# Patient Record
Sex: FEMALE | Race: ASIAN
[De-identification: names, ages, dates, MRNs, and addresses within clinical notes are randomized per-mention and may not be internally consistent; named-entity substitution may affect disease eponyms.]

---

## 2019-08-04 ENCOUNTER — HOSPITAL ENCOUNTER (EMERGENCY)
Dept: HOSPITAL 10 - FTE | Age: 39
Discharge: HOME | End: 2019-08-04
Payer: COMMERCIAL

## 2019-08-04 ENCOUNTER — HOSPITAL ENCOUNTER (EMERGENCY)
Dept: HOSPITAL 91 - FTE | Age: 39
Discharge: HOME | End: 2019-08-04
Payer: COMMERCIAL

## 2019-08-04 VITALS
WEIGHT: 128.53 LBS | WEIGHT: 128.53 LBS | BODY MASS INDEX: 21.94 KG/M2 | HEIGHT: 64 IN | HEIGHT: 64 IN | BODY MASS INDEX: 21.94 KG/M2

## 2019-08-04 VITALS — DIASTOLIC BLOOD PRESSURE: 56 MMHG | SYSTOLIC BLOOD PRESSURE: 91 MMHG | HEART RATE: 87 BPM | RESPIRATION RATE: 20 BRPM

## 2019-08-04 DIAGNOSIS — R05: Primary | ICD-10-CM

## 2019-08-04 LAB
ADD MAN DIFF?: NO
ANION GAP: 9 (ref 5–13)
BASOPHIL #: 0 10^3/UL (ref 0–0.1)
BASOPHILS %: 0.3 % (ref 0–2)
BLOOD UREA NITROGEN: 5 MG/DL (ref 7–20)
CALCIUM: 8.4 MG/DL (ref 8.4–10.2)
CARBON DIOXIDE: 21 MMOL/L (ref 21–31)
CHLORIDE: 109 MMOL/L (ref 97–110)
CREATININE: 0.47 MG/DL (ref 0.44–1)
EOSINOPHILS #: 0 10^3/UL (ref 0–0.5)
EOSINOPHILS %: 0.5 % (ref 0–7)
GLUCOSE: 82 MG/DL (ref 70–220)
HEMATOCRIT: 33.2 % (ref 37–47)
HEMOGLOBIN: 11.2 G/DL (ref 12–16)
IMMATURE GRANS #M: 0.04 10^3/UL (ref 0–0.03)
IMMATURE GRANS % (M): 0.5 % (ref 0–0.43)
LYMPHOCYTES #: 1.7 10^3/UL (ref 0.8–2.9)
LYMPHOCYTES %: 23.2 % (ref 15–51)
MEAN CORPUSCULAR HEMOGLOBIN: 33.4 PG (ref 29–33)
MEAN CORPUSCULAR HGB CONC: 33.7 G/DL (ref 32–37)
MEAN CORPUSCULAR VOLUME: 99.1 FL (ref 82–101)
MEAN PLATELET VOLUME: 10.3 FL (ref 7.4–10.4)
MONOCYTE #: 0.9 10^3/UL (ref 0.3–0.9)
MONOCYTES %: 11.9 % (ref 0–11)
NEUTROPHIL #: 4.7 10^3/UL (ref 1.6–7.5)
NEUTROPHILS %: 63.6 % (ref 39–77)
NUCLEATED RED BLOOD CELLS #: 0 10^3/UL (ref 0–0)
NUCLEATED RED BLOOD CELLS%: 0 /100WBC (ref 0–0)
PLATELET COUNT: 132 10^3/UL (ref 140–415)
POTASSIUM: 3.4 MMOL/L (ref 3.5–5.1)
RED BLOOD COUNT: 3.35 10^6/UL (ref 4.2–5.4)
RED CELL DISTRIBUTION WIDTH: 13.3 % (ref 11.5–14.5)
SODIUM: 139 MMOL/L (ref 135–144)
WHITE BLOOD COUNT: 7.4 10^3/UL (ref 4.8–10.8)

## 2019-08-04 PROCEDURE — 94664 DEMO&/EVAL PT USE INHALER: CPT

## 2019-08-04 PROCEDURE — 99283 EMERGENCY DEPT VISIT LOW MDM: CPT

## 2019-08-04 PROCEDURE — 80048 BASIC METABOLIC PNL TOTAL CA: CPT

## 2019-08-04 PROCEDURE — 85025 COMPLETE CBC W/AUTO DIFF WBC: CPT

## 2019-08-04 RX ADMIN — ALBUTEROL SULFATE 1 MG: 2.5 SOLUTION RESPIRATORY (INHALATION) at 11:12

## 2019-08-06 ENCOUNTER — HOSPITAL ENCOUNTER (INPATIENT)
Dept: HOSPITAL 91 - L-D | Age: 39
LOS: 2 days | Discharge: HOME | DRG: 832 | End: 2019-08-08
Payer: COMMERCIAL

## 2019-08-06 ENCOUNTER — HOSPITAL ENCOUNTER (INPATIENT)
Dept: HOSPITAL 10 - L-D | Age: 39
LOS: 2 days | Discharge: HOME | DRG: 832 | End: 2019-08-08
Attending: SPECIALIST | Admitting: SPECIALIST
Payer: COMMERCIAL

## 2019-08-06 VITALS — HEART RATE: 82 BPM | DIASTOLIC BLOOD PRESSURE: 64 MMHG | SYSTOLIC BLOOD PRESSURE: 104 MMHG | RESPIRATION RATE: 19 BRPM

## 2019-08-06 DIAGNOSIS — J06.9: ICD-10-CM

## 2019-08-06 DIAGNOSIS — O23.03: ICD-10-CM

## 2019-08-06 DIAGNOSIS — O99.513: Primary | ICD-10-CM

## 2019-08-06 DIAGNOSIS — Z3A.34: ICD-10-CM

## 2019-08-06 DIAGNOSIS — J02.9: ICD-10-CM

## 2019-08-06 DIAGNOSIS — J30.9: ICD-10-CM

## 2019-08-06 DIAGNOSIS — O09.523: ICD-10-CM

## 2019-08-06 DIAGNOSIS — O98.413: ICD-10-CM

## 2019-08-06 DIAGNOSIS — R09.82: ICD-10-CM

## 2019-08-06 DIAGNOSIS — B19.10: ICD-10-CM

## 2019-08-06 LAB
ADD MAN DIFF?: NO
ALANINE AMINOTRANSFERASE: 22 IU/L (ref 13–69)
ALBUMIN/GLOBULIN RATIO: 1
ALBUMIN: 3.4 G/DL (ref 3.3–4.9)
ALKALINE PHOSPHATASE: 190 IU/L (ref 42–121)
ANION GAP: 7 (ref 5–13)
ASPARTATE AMINO TRANSFERASE: 35 IU/L (ref 15–46)
BASOPHIL #: 0 10^3/UL (ref 0–0.1)
BASOPHILS %: 0.3 % (ref 0–2)
BILIRUBIN,DIRECT: 0 MG/DL (ref 0–0.2)
BILIRUBIN,TOTAL: 0.8 MG/DL (ref 0.2–1.3)
BLOOD UREA NITROGEN: 6 MG/DL (ref 7–20)
C-REACTIVE PROTEIN: 7.1 MG/DL (ref 0–0.9)
CALCIUM: 8.7 MG/DL (ref 8.4–10.2)
CARBON DIOXIDE: 24 MMOL/L (ref 21–31)
CHLORIDE: 106 MMOL/L (ref 97–110)
CREATININE: 0.52 MG/DL (ref 0.44–1)
EOSINOPHILS #: 0 10^3/UL (ref 0–0.5)
EOSINOPHILS %: 0.5 % (ref 0–7)
GLOBULIN: 3.4 G/DL (ref 1.3–3.2)
GLUCOSE: 102 MG/DL (ref 70–220)
HAAIG REFLEX: (no result)
HEMATOCRIT: 35 % (ref 37–47)
HEMOGLOBIN: 11.4 G/DL (ref 12–16)
HEPATITIS B CORE ANTIBODY: REACTIVE
HEPATITIS B SURFACE ANTIBODY: NEGATIVE
HEPATITIS B SURFACE ANTIGEN: POSITIVE
HEPATITIS C VIRAL ANTIBODY: NEGATIVE
IMMATURE GRANS #M: 0.02 10^3/UL (ref 0–0.03)
IMMATURE GRANS % (M): 0.3 % (ref 0–0.43)
LACTIC ACID: 1.1 MMOL/L (ref 0.5–2)
LYMPHOCYTES #: 0.9 10^3/UL (ref 0.8–2.9)
LYMPHOCYTES %: 13.8 % (ref 15–51)
MEAN CORPUSCULAR HEMOGLOBIN: 32.1 PG (ref 29–33)
MEAN CORPUSCULAR HGB CONC: 32.6 G/DL (ref 32–37)
MEAN CORPUSCULAR VOLUME: 98.6 FL (ref 82–101)
MEAN PLATELET VOLUME: 10.6 FL (ref 7.4–10.4)
MONOCYTE #: 0.6 10^3/UL (ref 0.3–0.9)
MONOCYTES %: 9.3 % (ref 0–11)
NEUTROPHIL #: 5 10^3/UL (ref 1.6–7.5)
NEUTROPHILS %: 75.8 % (ref 39–77)
NUCLEATED RED BLOOD CELLS #: 0 10^3/UL (ref 0–0)
NUCLEATED RED BLOOD CELLS%: 0 /100WBC (ref 0–0)
PLATELET COUNT: 173 10^3/UL (ref 140–415)
POTASSIUM: 3.7 MMOL/L (ref 3.5–5.1)
RED BLOOD COUNT: 3.55 10^6/UL (ref 4.2–5.4)
RED CELL DISTRIBUTION WIDTH: 13.4 % (ref 11.5–14.5)
SODIUM: 137 MMOL/L (ref 135–144)
TOTAL PROTEIN: 6.8 G/DL (ref 6.1–8.1)
WHITE BLOOD COUNT: 6.5 10^3/UL (ref 4.8–10.8)

## 2019-08-06 PROCEDURE — 86709 HEPATITIS A IGM ANTIBODY: CPT

## 2019-08-06 PROCEDURE — 83605 ASSAY OF LACTIC ACID: CPT

## 2019-08-06 PROCEDURE — 87340 HEPATITIS B SURFACE AG IA: CPT

## 2019-08-06 PROCEDURE — 84145 PROCALCITONIN (PCT): CPT

## 2019-08-06 PROCEDURE — 87086 URINE CULTURE/COLONY COUNT: CPT

## 2019-08-06 PROCEDURE — 86704 HEP B CORE ANTIBODY TOTAL: CPT

## 2019-08-06 PROCEDURE — 86692 HEPATITIS DELTA AGENT ANTBDY: CPT

## 2019-08-06 PROCEDURE — 94664 DEMO&/EVAL PT USE INHALER: CPT

## 2019-08-06 PROCEDURE — 76818 FETAL BIOPHYS PROFILE W/NST: CPT

## 2019-08-06 PROCEDURE — 87400 INFLUENZA A/B EACH AG IA: CPT

## 2019-08-06 PROCEDURE — 85025 COMPLETE CBC W/AUTO DIFF WBC: CPT

## 2019-08-06 PROCEDURE — 86140 C-REACTIVE PROTEIN: CPT

## 2019-08-06 PROCEDURE — 80053 COMPREHEN METABOLIC PANEL: CPT

## 2019-08-06 PROCEDURE — 94640 AIRWAY INHALATION TREATMENT: CPT

## 2019-08-06 PROCEDURE — 81003 URINALYSIS AUTO W/O SCOPE: CPT

## 2019-08-06 PROCEDURE — 87070 CULTURE OTHR SPECIMN AEROBIC: CPT

## 2019-08-06 PROCEDURE — 86803 HEPATITIS C AB TEST: CPT

## 2019-08-06 PROCEDURE — 71046 X-RAY EXAM CHEST 2 VIEWS: CPT

## 2019-08-06 PROCEDURE — 86706 HEP B SURFACE ANTIBODY: CPT

## 2019-08-06 RX ADMIN — HYDROCODONE BITARTRATE AND ACETAMINOPHEN 1 TAB: 5; 325 TABLET ORAL at 23:30

## 2019-08-06 RX ADMIN — CEFTRIAXONE 1 MLS/HR: 1 INJECTION, SOLUTION INTRAVENOUS at 21:44

## 2019-08-06 RX ADMIN — PYRIDOXINE HYDROCHLORIDE SCH MLS/HR: 100 INJECTION, SOLUTION INTRAMUSCULAR; INTRAVENOUS at 21:49

## 2019-08-06 RX ADMIN — MOMETASONE FUROATE 1 PUFF: 220 INHALANT RESPIRATORY (INHALATION) at 21:50

## 2019-08-06 RX ADMIN — PYRIDOXINE HYDROCHLORIDE 1 MLS/HR: 100 INJECTION, SOLUTION INTRAMUSCULAR; INTRAVENOUS at 18:56

## 2019-08-06 RX ADMIN — PYRIDOXINE HYDROCHLORIDE SCH MLS/HR: 100 INJECTION, SOLUTION INTRAMUSCULAR; INTRAVENOUS at 18:56

## 2019-08-06 RX ADMIN — CEFTRIAXONE SCH MLS/HR: 1 INJECTION, SOLUTION INTRAVENOUS at 21:44

## 2019-08-06 RX ADMIN — ALBUTEROL SULFATE 1 MG: 2.5 SOLUTION RESPIRATORY (INHALATION) at 22:46

## 2019-08-06 RX ADMIN — OSELTAMIVIR PHOSPHATE 1 MG: 75 CAPSULE ORAL at 22:30

## 2019-08-06 RX ADMIN — BENZONATATE PRN MG: 100 CAPSULE ORAL at 21:48

## 2019-08-06 RX ADMIN — PYRIDOXINE HYDROCHLORIDE 1 MLS/HR: 100 INJECTION, SOLUTION INTRAMUSCULAR; INTRAVENOUS at 21:49

## 2019-08-06 RX ADMIN — OSELTAMIVIR PHOSPHATE SCH MG: 75 CAPSULE ORAL at 22:30

## 2019-08-06 RX ADMIN — PROMETHAZINE HYDROCHLORIDE AND CODEINE PHOSPHATE 1 ML: 6.25; 1 SOLUTION ORAL at 19:17

## 2019-08-06 RX ADMIN — AZITHROMYCIN MONOHYDRATE SCH MLS/HR: 500 INJECTION, POWDER, LYOPHILIZED, FOR SOLUTION INTRAVENOUS at 22:36

## 2019-08-06 RX ADMIN — MOMETASONE FUROATE SCH PUFF: 220 INHALANT RESPIRATORY (INHALATION) at 21:50

## 2019-08-06 RX ADMIN — BENZONATATE 1 MG: 100 CAPSULE ORAL at 21:48

## 2019-08-06 RX ADMIN — AZITHROMYCIN MONOHYDRATE 1 MLS/HR: 500 INJECTION, POWDER, LYOPHILIZED, FOR SOLUTION INTRAVENOUS at 22:36

## 2019-08-06 NOTE — HP
Date/Time of Note


Date/Time of Note


DATE: 19 


TIME: 18:38





OB - History


Hx of Present Pregnancy


Free Text/Dictation


39-year-old  2 para 1 with pregnancy at 34 weeks and 2 days with due date


2019.  Patient developed upper respiratory tract infection about a


week ago with sore throat body aches fever and productive cough.  After a few 


days she developed right flank pain which may have been due to pulling her 


muscles.  She is in pain every time she coughs and right flank hurts.  She 


denies nausea vomiting.  She denies contractions vaginal bleeding or loss of 


fluid per vagina.  She reports good fetal movements.  She denies earaches 


headaches or shortness of breath.  She was seen in the emergency room at [facility] but since then her condition has deteriorated impatiens VU


and her .  Her  is also a physician from another country.  Patient


reports that she is very concerned for her well-being and she believes she is 


getting worse.


Prenatal Care:  Good Care


Ultrasounds:  Normal mid trimester US


Obstetrical Complications:  None


Medical Complications:  None





Past Family/Social History


*


Past Medical, Surgical, Family and Obstetric Histories reviewed from prenatal 


chart.





OB  Admission Exam


Physical Exam


HEENT:  WNL


Heart:  Rhythm Normal


Lungs:  Clear, Equal


Abdomen:  WNL


Extremities:  Normal


Reflexes:  Other (BACK: 2+ R CVA tenderness)


Last 72 hours Lab Results


                                    CBC & BMP


19 17:13











                                 Liver Function


               Test
                                19
17:13


               Alanine Aminotransferase
(ALT/SGPT)         22  



               Albumin                                     3.4


               Alkaline Phosphatase                       190  H


               Aspartate Amino Transf
(AST/SGOT)           35  



               Direct Bilirubin                           0.00


               Total Protein                               6.8








OB  Assessment/Plan


Other Assessment:


Pregnancy at 34 weeks and 2 days


Upper respiratory tract infection most probably viral


Right flank pain may be due to pyelonephritis versus a pulled ligament or muscle


due to chronic cough over the past week.


Other plan:


Admit to hospital.


IV Rocephin and Zithromax.


Urine culture and basic blood work.


Chest x-ray was negative for pneumonia











MARIAM LOPES MD             Aug 6, 2019 18:43

## 2019-08-06 NOTE — CONS
Assessment/Plan


Assessment/Plan


Hospital Course (Demo Recall)





#1 upper respiratory infection: Chest x-ray does not show any signs of pneumon


ia.  Patient is currently afebrile.  Suspicion for likely underlying viral 


versus bronchitis versus other.  She has been initiated on ceftriaxone and 


azithromycin already we will continue this.  I will check CRP and prolactin 


level.  We will also provide ipratropium nebulization as needed along with 


albuterol which is already on board.  She does have promethazine with codeine 


for cough 5 mL's however this has not helped I will increase the dose to 10 mL's


PRN.  We will also continue Tessalon Perles.





#2 right-sided flank pain: Likely muscle strain from repeated cough, nonetheless


we will also check a urinalysis to assess for underlying urine infection in the 


setting of possible pyelonephritis.  She is already on ceftriaxone.  Will give 


one-time dose of Norco 5-325 mg p.o. as Tylenol has not helped for her pain.  


Kidney function is within normal values.





#3 hepatitis B infection: Patient has been taking tenofovir 300 mg p.o. daily, 


will continue this.,  Will check hepatic serology.





#4 DVT and GI prophylaxis: CDs, no GI prophylaxis indicated





Thank you for this consultation we will continue to follow with you.





Consultation Date/Type/Reason


Admit Date/Time


Aug 6, 2019 at 15:50


Date of Consultation:  Aug 6, 2019


Type of Consult


medical management


Reason for Consultation


fever, cough, flank pain


Requesting Provider:  MARIAM WARD MD


Date/Time of Note


DATE: 8/6/19 


TIME: 20:24





Hx of Present Illness


Chief complaint: Cough x1 week, fever, sore throat





This is a 39-year-old female who is currently 34 weeks and 2 days pregnant who 


returns to [facility] with symptoms of cough that is been going on for 


approximately 1 week.  Patient originally was seen in the emergency department 


on 8/4 with similar symptoms.  Patient returns today with continued symptoms of 


a cough that is productive of yellowish-greenish sputum.  Patient also reports 


that she has been coughing a lot and thinks it possibly due to that she is 


having right-sided flank pain.  She denies any urinary symptoms.  Denies any 


dysuria.  She denies any abdominal contractions or vaginal bleeding.  She was 


seen by the OB doctor Dr. Ward and was admitted for further management.  


She was started on ceftriaxone and azithromycin.  She also did report good fetal


movements.  Patient does report that she has a history of hepatitis B infection 


and she is currently taking tenofovir 300 mg p.o. daily.  She denies any rashes 


or any vaginal discharge.  Denies any jaundice or any tea colored urine.








Allergies: NKDA





Medications: 


Albuterol as needed 


Tessalon Perles as needed


Tenofovir 300 mg p.o. daily


Const: As per HPI


Eyes : No pain discharge or redness or change in visual acuity


ENT: No pain, sore throat, congestion, congestion,  dysphagia or discharge


Respiratory: As per HPI


Cardiovascular: No chest pain, palpitation, PND, or edema


GI : no change in appetite, abdominal pain, nausea, vomiting, diarrhea, 


constipation, or change in the color his stool 


Genitourinary: No dysuria, hematuria, flank pain ,  discharge or CVA tenderness


Musculoskeletal: As per HPI


Skin: No rash, bruising or hives 


Neuro: No headache, dizziness, syncope, seizure, focal weakness


Endocrine: No polyuria, polydipsia, temperature intolerance


Psych: No hallucination, depression, anxiety or suicidal ideation





Past Medical History


hep b


Home Meds


Active Scripts


Albuterol Sulfate* (Albuterol Sulfate* Neb) 0.083%-3 Ml Neb, 2.5 MG NEB Q4 PRN 


for SHORTNESS OF BREATH, #30 EA


   Prov:TOMÁS MEYER PA-C         8/4/19


Benzonatate* (Tessalon Perle*) 100 Mg Capsule, 100 MG PO Q8H PRN for COUGH for 7


Days, CAP


   Prov:TOMÁS MEYER PA-C         8/4/19


Reported Medications


Tenofovir Disoproxil Fumarate (Tenofovir Disoproxil Fumarate) 300 Mg Tablet, 300


MG PO DAILY, TAB


   8/7/19


Medications





Current Medications


Acetaminophen (Tylenol Tab) 1,000 mg Q6H  PRN PO MILD PAIN(1-3)OR ELEVATED TEMP;


 Start 8/6/19 at 18:30


Albuterol (Proventil 0.083% (Neb)) 2.5 mg Q4H RESP THERAPY  PRN NEB SHORTNESS OF


BREATH;  Start 8/6/19 at 18:30


Benzonatate (Tessalon) 100 mg Q8H  PRN PO COUGH;  Start 8/6/19 at 18:30


Oseltamivir Phosphate (Tamiflu) 75 mg BID PO ;  Start 8/6/19 at 21:00


Promethazine HCl/ Codeine (Phenergan/ Codeine) 5 ml Q4H  PRN PO COUGH Last 


administered on 8/6/19at 19:17; Admin Dose 5 ML;  Start 8/6/19 at 18:30


Ceftriaxone Sodium 50 ml @  100 mls/hr Q24H IVPB ;  Start 8/6/19 at 18:30


Azithromycin 250 ml @  250 mls/hr Q24H IVPB ;  Start 8/6/19 at 18:30


Ondansetron HCl (Zofran Odt) 4 mg Q6H  PRN ODT NAUSEA/VOMITING;  Start 8/6/19 at


18:30


Diphenhydramine HCl (Benadryl) 25 mg QHS  PRN PO .INSOMNIA;  Start 8/6/19 at 


18:30


Albuterol (Ventolin Hfa) 2 puff Q4H RESP THERAPY  PRN INH SHORTNESS OF BREATH;  


Start 8/6/19 at 18:30


Mometasone Furoate (Asmanex) 1 puff BID INH ;  Start 8/6/19 at 21:00


Lactated Ringer's 1,000 ml @  125 mls/hr Q8H IV  Last administered on 8/6/19at 


18:56; Admin Dose 125 MLS/HR;  Start 8/6/19 at 19:00


Allergies:  


Coded Allergies:  


     No Known Allergy (Unverified , 8/4/19)





Past Surgical History


Past Surgical Hx:  no surgical history





Family History


Significant Family History:  no pertinent family hx





Social History


Alcohol Use:  none


Smoking Status:  Never smoker


Drug Use:  none





Exam/Review of Systems


Exam


Exam





General: Pacing the room, repeatedly coughing with production of yellowish-green


nasal discharge/sputum


HEENT: Atraumatic, normocephalic. The pupils are equal, round and reactive. 


Extraocular motor are intact


Neck: Supple with full range of motion. No rigidity or meningismus


Chest: Nontender


Lungs: Clear to auscultation bilaterally no crackles rales or wheezing, 


productive cough


Heart: Normal S1-S2, Regular rhythm and rate. No murmur, S3, or S4


Abdomen: Soft , nontender,  nondistended , bowel sounds are present. No guarding


no rebound tenderness , No masses or organomegaly. 


Genitourinary: Mild right-sided flank tenderness palpation


Extremities: Normal to inspection, no edema no cyanosis


Neurologic: Normal mental status, speech normal, cranial nerves II through XII 


are intact, motor and sensory are intact, no focal weakness


Additional Comments


PROCEDURE:   XR Chest PA and Lateral


 


CLINICAL INDICATION:   Pneumonia 


 


TECHNIQUE:   PA and Lateral views of the chest were obtained. 


 


COMPARISON:   None. 


 


FINDINGS:


 


Cardiovascular: The cardiovascular silhouette appears unremarkable.


 


Lung Fields: The lung fields appear clear with no nodule, alveolar infiltrate, 


or interstitial prominence evident.


 


Pleural Spaces: No pneumothorax is identified and no effusion is evident.


 


Osseous Structures: The osseous structures appear intact.


 


Soft Tissues: The soft tissues appear unremarkable.


 


IMPRESSION: Unremarkable chest.


_____________________________________________ 


Physician Garfield           Date    Time 


Electronically viewed and signed by Physician Garfield on 08/06/2019 17:50 


 


D:  08/06/2019 17:50  T:  08/06/2019 17:50


RH/





CC: MARIAM WARD MD





[id number]


PROCEDURE:   US OB biophysical profile. 


 


CLINICAL INDICATION:    decreased fetal movements, pneumonia 


 


TECHNIQUE:   Multiple sonographic images of the pelvis were obtained.  The 


images were reviewed on a PACS workstation. 


 


COMPARISON:   No prior studies are available for comparison. 


 


FINDINGS:


 


There is a single live intrauterine gestation.  Cardiac activity is present with


166 beats per minute. 


There is a vertex presentation. 


The placenta is posterior left lateral.   There is no evidence of placental 


abruption.


REY = 9.9 cm.


 


Biophysical profile:


Fetal movement 2/2


Fetal tone 2/2.


Fetal breathing 2/2 


REY 2/2


 


Total 8/8 


 


RPTAT: AA . 


 


IMPRESSION:


 


Normal biophysical profile.


_____________________________________________ 


.Fran Herrera MD, MD           Date    Time 


Electronically viewed and signed by .Fran Herrera MD, MD on 08/06/2019 


18:07 


 


D:  08/06/2019 18:07  T:  08/06/2019 18:07


.S/





CC: MARIAM WARD MD





[id number]





Results


Result Diagram:  


8/6/19 1713                                                                     


          8/6/19 1713





Results 24hrs





Laboratory Tests


               Test
                                8/6/19
17:13


               White Blood Count                           6.5


               Red Blood Count                           3.55  L


               Hemoglobin                                11.4  L


               Hematocrit                                35.0  L


               Mean Corpuscular Volume                    98.6


               Mean Corpuscular Hemoglobin                32.1


               Mean Corpuscular Hemoglobin
Concent       32.6  



               Red Cell Distribution Width                13.4


               Platelet Count                             173  #


               Mean Platelet Volume                      10.6  H


               Immature Granulocytes %                   0.300


               Neutrophils %                              75.8


               Lymphocytes %                             13.8  L


               Monocytes %                                 9.3


               Eosinophils %                               0.5


               Basophils %                                 0.3


               Nucleated Red Blood Cells %                 0.0


               Immature Granulocytes #                   0.020


               Neutrophils #                               5.0


               Lymphocytes #                               0.9


               Monocytes #                                 0.6


               Eosinophils #                               0.0


               Basophils #                                 0.0


               Nucleated Red Blood Cells #                 0.0


               Sodium Level                                137


               Potassium Level                             3.7


               Chloride Level                              106


               Carbon Dioxide Level                         24


               Anion Gap                                     7


               Blood Urea Nitrogen                          6  L


               Creatinine                                 0.52


               Est Glomerular Filtrat Rate
mL/min   > 60  



               Glucose Level                               102


               Lactic Acid Level                           1.1


               Calcium Level                               8.7


               Total Bilirubin                             0.8


               Direct Bilirubin                           0.00


               Indirect Bilirubin                          0.8


               Aspartate Amino Transf
(AST/SGOT)           35  



               Alanine Aminotransferase
(ALT/SGPT)         22  



               Alkaline Phosphatase                       190  H


               Total Protein                               6.8


               Albumin                                     3.4


               Globulin                                  3.40  H


               Albumin/Globulin Ratio                     1.00








Medications


Medication





Current Medications


Acetaminophen (Tylenol Tab) 1,000 mg Q6H  PRN PO MILD PAIN(1-3)OR ELEVATED TEMP;


 Start 8/6/19 at 18:30


Albuterol (Proventil 0.083% (Neb)) 2.5 mg Q4H RESP THERAPY  PRN NEB SHORTNESS OF


BREATH;  Start 8/6/19 at 18:30


Benzonatate (Tessalon) 100 mg Q8H  PRN PO COUGH;  Start 8/6/19 at 18:30


Oseltamivir Phosphate (Tamiflu) 75 mg BID PO ;  Start 8/6/19 at 21:00


Promethazine HCl/ Codeine (Phenergan/ Codeine) 5 ml Q4H  PRN PO COUGH Last 


administered on 8/6/19at 19:17; Admin Dose 5 ML;  Start 8/6/19 at 18:30


Ceftriaxone Sodium 50 ml @  100 mls/hr Q24H IVPB ;  Start 8/6/19 at 18:30


Azithromycin 250 ml @  250 mls/hr Q24H IVPB ;  Start 8/6/19 at 18:30


Ondansetron HCl (Zofran Odt) 4 mg Q6H  PRN ODT NAUSEA/VOMITING;  Start 8/6/19 at


18:30


Diphenhydramine HCl (Benadryl) 25 mg QHS  PRN PO .INSOMNIA;  Start 8/6/19 at 


18:30


Albuterol (Ventolin Hfa) 2 puff Q4H RESP THERAPY  PRN INH SHORTNESS OF BREATH;  


Start 8/6/19 at 18:30


Mometasone Furoate (Asmanex) 1 puff BID INH ;  Start 8/6/19 at 21:00


Lactated Ringer's 1,000 ml @  125 mls/hr Q8H IV  Last administered on 8/6/19at 


18:56; Admin Dose 125 MLS/HR;  Start 8/6/19 at 19:00











MACIEJ BURGER                  Aug 6, 2019 20:25

## 2019-08-07 VITALS — HEART RATE: 94 BPM | SYSTOLIC BLOOD PRESSURE: 100 MMHG | DIASTOLIC BLOOD PRESSURE: 56 MMHG | RESPIRATION RATE: 16 BRPM

## 2019-08-07 VITALS — DIASTOLIC BLOOD PRESSURE: 55 MMHG | HEART RATE: 81 BPM | RESPIRATION RATE: 17 BRPM | SYSTOLIC BLOOD PRESSURE: 100 MMHG

## 2019-08-07 VITALS — DIASTOLIC BLOOD PRESSURE: 59 MMHG | RESPIRATION RATE: 18 BRPM | SYSTOLIC BLOOD PRESSURE: 97 MMHG | HEART RATE: 89 BPM

## 2019-08-07 VITALS — RESPIRATION RATE: 18 BRPM | DIASTOLIC BLOOD PRESSURE: 56 MMHG | HEART RATE: 95 BPM | SYSTOLIC BLOOD PRESSURE: 95 MMHG

## 2019-08-07 LAB
ADD MAN DIFF?: NO
ADD UMIC: NO
ALANINE AMINOTRANSFERASE: 22 IU/L (ref 13–69)
ALBUMIN/GLOBULIN RATIO: 0.88
ALBUMIN: 3.1 G/DL (ref 3.3–4.9)
ALKALINE PHOSPHATASE: 191 IU/L (ref 42–121)
ANION GAP: 10 (ref 5–13)
ASPARTATE AMINO TRANSFERASE: 24 IU/L (ref 15–46)
BASOPHIL #: 0 10^3/UL (ref 0–0.1)
BASOPHILS %: 0.3 % (ref 0–2)
BILIRUBIN,DIRECT: 0 MG/DL (ref 0–0.2)
BILIRUBIN,TOTAL: 0.7 MG/DL (ref 0.2–1.3)
BLOOD UREA NITROGEN: 5 MG/DL (ref 7–20)
CALCIUM: 8.7 MG/DL (ref 8.4–10.2)
CARBON DIOXIDE: 24 MMOL/L (ref 21–31)
CHLORIDE: 107 MMOL/L (ref 97–110)
CREATININE: 0.55 MG/DL (ref 0.44–1)
EOSINOPHILS #: 0 10^3/UL (ref 0–0.5)
EOSINOPHILS %: 0.5 % (ref 0–7)
GLOBULIN: 3.5 G/DL (ref 1.3–3.2)
GLUCOSE: 102 MG/DL (ref 70–220)
HEMATOCRIT: 35.2 % (ref 37–47)
HEMOGLOBIN: 11.4 G/DL (ref 12–16)
IMMATURE GRANS #M: 0.04 10^3/UL (ref 0–0.03)
IMMATURE GRANS % (M): 0.6 % (ref 0–0.43)
LYMPHOCYTES #: 1.2 10^3/UL (ref 0.8–2.9)
LYMPHOCYTES %: 18.8 % (ref 15–51)
MEAN CORPUSCULAR HEMOGLOBIN: 32.3 PG (ref 29–33)
MEAN CORPUSCULAR HGB CONC: 32.4 G/DL (ref 32–37)
MEAN CORPUSCULAR VOLUME: 99.7 FL (ref 82–101)
MEAN PLATELET VOLUME: 10.3 FL (ref 7.4–10.4)
MONOCYTE #: 0.6 10^3/UL (ref 0.3–0.9)
MONOCYTES %: 9.3 % (ref 0–11)
NEUTROPHIL #: 4.6 10^3/UL (ref 1.6–7.5)
NEUTROPHILS %: 70.5 % (ref 39–77)
NUCLEATED RED BLOOD CELLS #: 0 10^3/UL (ref 0–0)
NUCLEATED RED BLOOD CELLS%: 0 /100WBC (ref 0–0)
PLATELET COUNT: 175 10^3/UL (ref 140–415)
POTASSIUM: 3.4 MMOL/L (ref 3.5–5.1)
PROCALCITONIN: 0.06 NG/ML (ref 0–0.1)
RED BLOOD COUNT: 3.53 10^6/UL (ref 4.2–5.4)
RED CELL DISTRIBUTION WIDTH: 13.6 % (ref 11.5–14.5)
SODIUM: 141 MMOL/L (ref 135–144)
TOTAL PROTEIN: 6.6 G/DL (ref 6.1–8.1)
UR ASCORBIC ACID: NEGATIVE MG/DL
UR BILIRUBIN (DIP): NEGATIVE MG/DL
UR BLOOD (DIP): NEGATIVE MG/DL
UR CLARITY: CLEAR
UR COLOR: YELLOW
UR GLUCOSE (DIP): NEGATIVE MG/DL
UR KETONES (DIP): (no result) MG/DL
UR LEUKOCYTE ESTERASE (DIP): NEGATIVE LEU/UL
UR NITRITE (DIP): NEGATIVE MG/DL
UR PH (DIP): 6 (ref 5–9)
UR SPECIFIC GRAVITY (DIP): 1.01 (ref 1–1.03)
UR TOTAL PROTEIN (DIP): NEGATIVE MG/DL
UR UROBILINOGEN (DIP): NEGATIVE MG/DL
WHITE BLOOD COUNT: 6.5 10^3/UL (ref 4.8–10.8)

## 2019-08-07 RX ADMIN — AZITHROMYCIN MONOHYDRATE SCH MLS/HR: 500 INJECTION, POWDER, LYOPHILIZED, FOR SOLUTION INTRAVENOUS at 18:38

## 2019-08-07 RX ADMIN — GUAIFENESIN 1 MG: 100 SOLUTION ORAL at 21:02

## 2019-08-07 RX ADMIN — CEFTRIAXONE 1 MLS/HR: 1 INJECTION, SOLUTION INTRAVENOUS at 17:20

## 2019-08-07 RX ADMIN — BENZONATATE PRN MG: 100 CAPSULE ORAL at 08:04

## 2019-08-07 RX ADMIN — DIPHENHYDRAMINE HYDROCHLORIDE 1 MG: 25 CAPSULE ORAL at 00:10

## 2019-08-07 RX ADMIN — AZITHROMYCIN MONOHYDRATE 1 MLS/HR: 500 INJECTION, POWDER, LYOPHILIZED, FOR SOLUTION INTRAVENOUS at 18:38

## 2019-08-07 RX ADMIN — MOMETASONE FUROATE SCH PUFF: 220 INHALANT RESPIRATORY (INHALATION) at 21:00

## 2019-08-07 RX ADMIN — CEFTRIAXONE 1 MLS/HR: 1 INJECTION, SOLUTION INTRAVENOUS at 18:03

## 2019-08-07 RX ADMIN — DIPHENHYDRAMINE HYDROCHLORIDE PRN MG: 25 CAPSULE ORAL at 00:10

## 2019-08-07 RX ADMIN — BENZONATATE 1 MG: 100 CAPSULE ORAL at 21:00

## 2019-08-07 RX ADMIN — IPRATROPIUM BROMIDE 1 MG: 0.5 SOLUTION RESPIRATORY (INHALATION) at 09:17

## 2019-08-07 RX ADMIN — CEFTRIAXONE SCH MLS/HR: 1 INJECTION, SOLUTION INTRAVENOUS at 18:03

## 2019-08-07 RX ADMIN — PYRIDOXINE HYDROCHLORIDE SCH MLS/HR: 100 INJECTION, SOLUTION INTRAMUSCULAR; INTRAVENOUS at 17:25

## 2019-08-07 RX ADMIN — MOMETASONE FUROATE SCH PUFF: 220 INHALANT RESPIRATORY (INHALATION) at 08:05

## 2019-08-07 RX ADMIN — PYRIDOXINE HYDROCHLORIDE SCH MLS/HR: 100 INJECTION, SOLUTION INTRAMUSCULAR; INTRAVENOUS at 18:39

## 2019-08-07 RX ADMIN — PYRIDOXINE HYDROCHLORIDE 1 MLS/HR: 100 INJECTION, SOLUTION INTRAMUSCULAR; INTRAVENOUS at 11:00

## 2019-08-07 RX ADMIN — PROMETHAZINE HYDROCHLORIDE AND CODEINE PHOSPHATE PRN ML: 6.25; 1 SOLUTION ORAL at 05:41

## 2019-08-07 RX ADMIN — DIPHENHYDRAMINE HYDROCHLORIDE 1 MG: 25 CAPSULE ORAL at 22:52

## 2019-08-07 RX ADMIN — PYRIDOXINE HYDROCHLORIDE SCH MLS/HR: 100 INJECTION, SOLUTION INTRAMUSCULAR; INTRAVENOUS at 03:00

## 2019-08-07 RX ADMIN — TENOFOVIR DISOPROXIL FUMARATE SCH MG: 300 TABLET, COATED ORAL at 08:05

## 2019-08-07 RX ADMIN — BENZONATATE PRN MG: 100 CAPSULE ORAL at 21:00

## 2019-08-07 RX ADMIN — PROMETHAZINE HYDROCHLORIDE AND CODEINE PHOSPHATE 1 ML: 6.25; 1 SOLUTION ORAL at 05:41

## 2019-08-07 RX ADMIN — PYRIDOXINE HYDROCHLORIDE SCH MLS/HR: 100 INJECTION, SOLUTION INTRAMUSCULAR; INTRAVENOUS at 11:00

## 2019-08-07 RX ADMIN — OSELTAMIVIR PHOSPHATE 1 MG: 75 CAPSULE ORAL at 08:05

## 2019-08-07 RX ADMIN — BENZONATATE 1 MG: 100 CAPSULE ORAL at 08:04

## 2019-08-07 RX ADMIN — PYRIDOXINE HYDROCHLORIDE 1 MLS/HR: 100 INJECTION, SOLUTION INTRAMUSCULAR; INTRAVENOUS at 17:25

## 2019-08-07 RX ADMIN — PROMETHAZINE HYDROCHLORIDE AND CODEINE PHOSPHATE PRN ML: 6.25; 1 SOLUTION ORAL at 17:20

## 2019-08-07 RX ADMIN — ALBUTEROL SULFATE 1 MG: 2.5 SOLUTION RESPIRATORY (INHALATION) at 09:17

## 2019-08-07 RX ADMIN — BISACODYL 1 MG: 5 TABLET, COATED ORAL at 16:21

## 2019-08-07 RX ADMIN — CEFTRIAXONE SCH MLS/HR: 1 INJECTION, SOLUTION INTRAVENOUS at 17:20

## 2019-08-07 RX ADMIN — DIPHENHYDRAMINE HYDROCHLORIDE PRN MG: 25 CAPSULE ORAL at 22:52

## 2019-08-07 RX ADMIN — PYRIDOXINE HYDROCHLORIDE 1 MLS/HR: 100 INJECTION, SOLUTION INTRAMUSCULAR; INTRAVENOUS at 18:39

## 2019-08-07 RX ADMIN — PROMETHAZINE HYDROCHLORIDE AND CODEINE PHOSPHATE 1 ML: 6.25; 1 SOLUTION ORAL at 17:20

## 2019-08-07 RX ADMIN — OSELTAMIVIR PHOSPHATE SCH MG: 75 CAPSULE ORAL at 08:05

## 2019-08-07 RX ADMIN — PYRIDOXINE HYDROCHLORIDE 1 MLS/HR: 100 INJECTION, SOLUTION INTRAMUSCULAR; INTRAVENOUS at 03:00

## 2019-08-07 RX ADMIN — MOMETASONE FUROATE 1 PUFF: 220 INHALANT RESPIRATORY (INHALATION) at 08:05

## 2019-08-07 RX ADMIN — POTASSIUM CHLORIDE 1 MEQ: 1500 TABLET, EXTENDED RELEASE ORAL at 16:21

## 2019-08-07 RX ADMIN — MOMETASONE FUROATE 1 PUFF: 220 INHALANT RESPIRATORY (INHALATION) at 21:00

## 2019-08-07 RX ADMIN — FLUTICASONE PROPIONATE 1 SPRAY: 50 SPRAY, METERED NASAL at 16:48

## 2019-08-07 RX ADMIN — TENOFOVIR DISOPROXIL FUMARATE 1 MG: 300 TABLET, COATED ORAL at 08:05

## 2019-08-07 NOTE — PN
Date/Time of Note


Date/Time of Note


DATE: 8/7/19 


TIME: 15:59





Assessment/Plan


VTE Prophylaxis


Risk score (from [facility])>0 risk:  1


SCD applied (from [facility]):  Yes


Pharmacological prophylaxis:  other


Pharm contraindication:  other





Lines/Catheters


IV Catheter Type (from [facility]):  Peripheral IV





Assessment/Plan


Assessment/Plan


1. Rhinitis, likely sinusitis, flonase, continue antibiotics


2. Cough due to post nasal drainage, robitussin prn


3. Normal pregnancy


Result Diagram:  


8/7/19 0606                                                                     


          8/7/19 0606





Results 24hrs





Laboratory Tests


 Test
                                8/6/19
17:09  8/6/19
17:13  8/7/19
06:06


 C-Reactive Protein                         7.1  H


 Procalcitonin                              0.06


 Hepatitis B Surface Antigen          POSITIVE  H


 Hepatitis B Surface Antibody         NEGATIVE


 Hepatitis B Core Total
Antibody      REACTIVE  H
  
             



 Hepatitis C Antibody                 NEGATIVE


 White Blood Count                                         6.5           6.5


 Red Blood Count                                         3.55  L       3.53  L


 Hemoglobin                                              11.4  L       11.4  L


 Hematocrit                                              35.0  L       35.2  L


 Mean Corpuscular Volume                                  98.6          99.7


 Mean Corpuscular Hemoglobin                              32.1          32.3


 Mean Corpuscular Hemoglobin
Concent  
                  32.6  
       32.4  



 Red Cell Distribution Width                              13.4          13.6


 Platelet Count                                           173  #         175


 Mean Platelet Volume                                    10.6  H        10.3


 Immature Granulocytes %                                 0.300        0.600  H


 Neutrophils %                                            75.8          70.5


 Lymphocytes %                                           13.8  L        18.8


 Monocytes %                                               9.3           9.3


 Eosinophils %                                             0.5           0.5


 Basophils %                                               0.3           0.3


 Nucleated Red Blood Cells %                               0.0           0.0


 Immature Granulocytes #                                 0.020        0.040  H


 Neutrophils #                                             5.0           4.6


 Lymphocytes #                                             0.9           1.2


 Monocytes #                                               0.6           0.6


 Eosinophils #                                             0.0           0.0


 Basophils #                                               0.0           0.0


 Nucleated Red Blood Cells #                               0.0           0.0


 Sodium Level                                              137           141


 Potassium Level                                           3.7          3.4  L


 Chloride Level                                            106           107


 Carbon Dioxide Level                                       24            24


 Anion Gap                                                   7            10


 Blood Urea Nitrogen                                        6  L          5  L


 Creatinine                                               0.52          0.55


 Est Glomerular Filtrat Rate
mL/min   
             > 60  
       > 60  



 Glucose Level                                             102           102


 Lactic Acid Level                                         1.1


 Calcium Level                                             8.7           8.7


 Total Bilirubin                                           0.8           0.7


 Direct Bilirubin                                         0.00          0.00


 Indirect Bilirubin                                        0.8           0.7


 Aspartate Amino Transf
(AST/SGOT)    
                    35  
         24  



 Alanine Aminotransferase
(ALT/SGPT)  
                    22  
         22  



 Alkaline Phosphatase                                     190  H        191  H


 Total Protein                                             6.8           6.6


 Albumin                                                   3.4          3.1  L


 Globulin                                                3.40  H       3.50  H


 Albumin/Globulin Ratio                                   1.00          0.88








Subjective


24 Hr Interval Summary


Free Text/Dictation


cough, nasal congestion with thick yellowish discharge, coughs when lays down





Exam/Review of Systems


Exam


Vitals





Vital Signs


  Date      Temp  Pulse  Resp  B/P (MAP)   Pulse Ox  O2          O2 Flow    FiO2


Time                                                 Delivery    Rate


    8/7/19  94.2     95    18  95/56 (69)       100


     14:35


    8/7/19                                                                    21


     09:18





Constitutional:  alert, oriented, well developed


Head:  normocephalic, atraumatic


Eyes:  nl conjunctiva, EOMI, nl lids


ENMT:  nl external ears & nose, nl lips & teeth, nl nasal mucosa & septum


Neck:  supple, non-tender


Respiratory:  clear to auscultation, normal air movement; 


   No congested cough, No crackles/rales, No diminished breath sounds, No 


intercostal retraction, No labored breathing, No respirations, No tactile 


fremitus, No wheezing, No other


Cardiovascular:  regular rate and rhythm, nl pulses; 


   No bruits, No diastolic murmur, No edema, No gallop, No irregular rhythm, No 


jugular venous distention (JVD), No murmurs/extra sounds, No rub, No systolic 


murmur, No S3, No S4, No other


Gastrointestinal:  soft, nl liver, spleen, non-tender; 


   No ascites, No bowel sounds, No distended, No firm, No hepatomegaly, No mass,


No rebound or guarding, No splenomegaly, No surgical scars, No tender, No other


Musculoskeletal:  nl extremities to inspection


Extremities:  normal pulses; 


   No calf tenderness, No cyanosis, No clubbing, No edema, No pitting pedal 


edema, No palpable cord, No tenderness, No other


Neurological:  CNS II-XII intact, nl mental status, nl speech, nl strength





Results


Results 24hrs





Laboratory Tests


 Test
                                8/6/19
17:09  8/6/19
17:13  8/7/19
06:06


 C-Reactive Protein                         7.1  H


 Procalcitonin                              0.06


 Hepatitis B Surface Antigen          POSITIVE  H


 Hepatitis B Surface Antibody         NEGATIVE


 Hepatitis B Core Total
Antibody      REACTIVE  H
  
             



 Hepatitis C Antibody                 NEGATIVE


 White Blood Count                                         6.5           6.5


 Red Blood Count                                         3.55  L       3.53  L


 Hemoglobin                                              11.4  L       11.4  L


 Hematocrit                                              35.0  L       35.2  L


 Mean Corpuscular Volume                                  98.6          99.7


 Mean Corpuscular Hemoglobin                              32.1          32.3


 Mean Corpuscular Hemoglobin
Concent  
                  32.6  
       32.4  



 Red Cell Distribution Width                              13.4          13.6


 Platelet Count                                           173  #         175


 Mean Platelet Volume                                    10.6  H        10.3


 Immature Granulocytes %                                 0.300        0.600  H


 Neutrophils %                                            75.8          70.5


 Lymphocytes %                                           13.8  L        18.8


 Monocytes %                                               9.3           9.3


 Eosinophils %                                             0.5           0.5


 Basophils %                                               0.3           0.3


 Nucleated Red Blood Cells %                               0.0           0.0


 Immature Granulocytes #                                 0.020        0.040  H


 Neutrophils #                                             5.0           4.6


 Lymphocytes #                                             0.9           1.2


 Monocytes #                                               0.6           0.6


 Eosinophils #                                             0.0           0.0


 Basophils #                                               0.0           0.0


 Nucleated Red Blood Cells #                               0.0           0.0


 Sodium Level                                              137           141


 Potassium Level                                           3.7          3.4  L


 Chloride Level                                            106           107


 Carbon Dioxide Level                                       24            24


 Anion Gap                                                   7            10


 Blood Urea Nitrogen                                        6  L          5  L


 Creatinine                                               0.52          0.55


 Est Glomerular Filtrat Rate
mL/min   
             > 60  
       > 60  



 Glucose Level                                             102           102


 Lactic Acid Level                                         1.1


 Calcium Level                                             8.7           8.7


 Total Bilirubin                                           0.8           0.7


 Direct Bilirubin                                         0.00          0.00


 Indirect Bilirubin                                        0.8           0.7


 Aspartate Amino Transf
(AST/SGOT)    
                    35  
         24  



 Alanine Aminotransferase
(ALT/SGPT)  
                    22  
         22  



 Alkaline Phosphatase                                     190  H        191  H


 Total Protein                                             6.8           6.6


 Albumin                                                   3.4          3.1  L


 Globulin                                                3.40  H       3.50  H


 Albumin/Globulin Ratio                                   1.00          0.88








Medications


Medication





Current Medications


Acetaminophen (Tylenol Tab) 1,000 mg Q6H  PRN PO MILD PAIN(1-3)OR ELEVATED TEMP;


 Start 8/6/19 at 18:30


Albuterol (Proventil 0.083% (Neb)) 2.5 mg Q4H RESP THERAPY  PRN NEB SHORTNESS OF


BREATH Last administered on 8/7/19at 09:17; Admin Dose 2.5 MG;  Start 8/6/19 at 


18:30


Benzonatate (Tessalon) 100 mg Q8H  PRN PO COUGH Last administered on 8/7/19at 


08:04; Admin Dose 100 MG;  Start 8/6/19 at 18:30


Oseltamivir Phosphate (Tamiflu) 75 mg BID PO  Last administered on 8/7/19at 


08:05; Admin Dose 75 MG;  Start 8/6/19 at 21:00


Ceftriaxone Sodium 50 ml @  100 mls/hr Q24H IVPB  Last administered on 8/6/19at 


21:44; Admin Dose 100 MLS/HR;  Start 8/6/19 at 18:30


Azithromycin 250 ml @  250 mls/hr Q24H IVPB  Last administered on 8/6/19at 


22:36; Admin Dose 250 MLS/HR;  Start 8/6/19 at 18:30


Ondansetron HCl (Zofran Odt) 4 mg Q6H  PRN ODT NAUSEA/VOMITING;  Start 8/6/19 at


18:30


Diphenhydramine HCl (Benadryl) 25 mg QHS  PRN PO .INSOMNIA Last administered on 


8/7/19at 00:10; Admin Dose 25 MG;  Start 8/6/19 at 18:30


Albuterol (Ventolin Hfa) 2 puff Q4H RESP THERAPY  PRN INH SHORTNESS OF BREATH;  


Start 8/6/19 at 18:30


Mometasone Furoate (Asmanex) 1 puff BID INH  Last administered on 8/7/19at 


08:05; Admin Dose 1 PUFF;  Start 8/6/19 at 21:00


Lactated Ringer's 1,000 ml @  125 mls/hr Q8H IV  Last administered on 8/6/19at 


21:49; Admin Dose 125 MLS/HR;  Start 8/6/19 at 19:00


Ipratropium Bromide (Atrovent 0.02% (Neb)) 0.5 mg Q4H RESP THERAPY  PRN HHN 


SHORTNESS OF BREATH Last administered on 8/7/19at 09:17; Admin Dose 0.5 MG;  


Start 8/6/19 at 23:30


Promethazine HCl/ Codeine (Phenergan/ Codeine) 10 ml Q4H  PRN PO COUGH Last 


administered on 8/7/19at 05:41; Admin Dose 10 ML;  Start 8/7/19 at 00:00


Tenofovir Disoproxil Fumarate (Viread) 300 mg WITH  BREAKFAST PO  Last 


administered on 8/7/19at 08:05; Admin Dose 300 MG;  Start 8/7/19 at 08:00


Potassium Chloride (Klor-Con 20) 40 meq ONCE  STAT PO ;  Start 8/7/19 at 15:50; 


Stop 8/7/19 at 15:51;  Status LISSETT LINDO MD                 Aug 7, 2019 16:09

## 2019-08-07 NOTE — QN
Documentation


Comment


Patient continues with productive cough and runny nose and sore throat.  She has


incontinence with coughing.


She complains of significant body ache with cough.


Abdomen is soft gravid nontender


Back significant tenderness over her back


Plan continue antibiotics and discharge home tomorrow











MARIAM LOPES MD             Aug 7, 2019 20:10

## 2019-08-08 VITALS — DIASTOLIC BLOOD PRESSURE: 60 MMHG | HEART RATE: 78 BPM | RESPIRATION RATE: 15 BRPM | SYSTOLIC BLOOD PRESSURE: 102 MMHG

## 2019-08-08 VITALS — HEART RATE: 80 BPM | DIASTOLIC BLOOD PRESSURE: 58 MMHG | RESPIRATION RATE: 17 BRPM | SYSTOLIC BLOOD PRESSURE: 103 MMHG

## 2019-08-08 LAB
ADD MAN DIFF?: NO
ALANINE AMINOTRANSFERASE: 26 IU/L (ref 13–69)
ALBUMIN/GLOBULIN RATIO: 0.81
ALBUMIN: 2.7 G/DL (ref 3.3–4.9)
ALKALINE PHOSPHATASE: 172 IU/L (ref 42–121)
ANION GAP: 6 (ref 5–13)
ASPARTATE AMINO TRANSFERASE: 23 IU/L (ref 15–46)
BASOPHIL #: 0 10^3/UL (ref 0–0.1)
BASOPHILS %: 0.3 % (ref 0–2)
BILIRUBIN,DIRECT: 0 MG/DL (ref 0–0.2)
BILIRUBIN,TOTAL: 0.6 MG/DL (ref 0.2–1.3)
BLOOD UREA NITROGEN: 5 MG/DL (ref 7–20)
CALCIUM: 8.9 MG/DL (ref 8.4–10.2)
CARBON DIOXIDE: 24 MMOL/L (ref 21–31)
CHLORIDE: 105 MMOL/L (ref 97–110)
CREATININE: 0.51 MG/DL (ref 0.44–1)
EOSINOPHILS #: 0.1 10^3/UL (ref 0–0.5)
EOSINOPHILS %: 1 % (ref 0–7)
GLOBULIN: 3.3 G/DL (ref 1.3–3.2)
GLUCOSE: 83 MG/DL (ref 70–220)
HEMATOCRIT: 30.2 % (ref 37–47)
HEMOGLOBIN: 10.2 G/DL (ref 12–16)
HEPATITIS B SURFACE ANTIGEN: REACTIVE
IMMATURE GRANS #M: 0.04 10^3/UL (ref 0–0.03)
IMMATURE GRANS % (M): 0.6 % (ref 0–0.43)
LYMPHOCYTES #: 1.3 10^3/UL (ref 0.8–2.9)
LYMPHOCYTES %: 20.6 % (ref 15–51)
MEAN CORPUSCULAR HEMOGLOBIN: 32.6 PG (ref 29–33)
MEAN CORPUSCULAR HGB CONC: 33.8 G/DL (ref 32–37)
MEAN CORPUSCULAR VOLUME: 96.5 FL (ref 82–101)
MEAN PLATELET VOLUME: 10.1 FL (ref 7.4–10.4)
MONOCYTE #: 0.6 10^3/UL (ref 0.3–0.9)
MONOCYTES %: 9 % (ref 0–11)
NEUTROPHIL #: 4.3 10^3/UL (ref 1.6–7.5)
NEUTROPHILS %: 68.5 % (ref 39–77)
NUCLEATED RED BLOOD CELLS #: 0 10^3/UL (ref 0–0)
NUCLEATED RED BLOOD CELLS%: 0 /100WBC (ref 0–0)
PLATELET COUNT: 164 10^3/UL (ref 140–415)
POTASSIUM: 3.5 MMOL/L (ref 3.5–5.1)
RED BLOOD COUNT: 3.13 10^6/UL (ref 4.2–5.4)
RED CELL DISTRIBUTION WIDTH: 13.5 % (ref 11.5–14.5)
SODIUM: 135 MMOL/L (ref 135–144)
TOTAL PROTEIN: 6 G/DL (ref 6.1–8.1)
WHITE BLOOD COUNT: 6.3 10^3/UL (ref 4.8–10.8)

## 2019-08-08 RX ADMIN — PYRIDOXINE HYDROCHLORIDE SCH MLS/HR: 100 INJECTION, SOLUTION INTRAMUSCULAR; INTRAVENOUS at 03:11

## 2019-08-08 RX ADMIN — PYRIDOXINE HYDROCHLORIDE 1 MLS/HR: 100 INJECTION, SOLUTION INTRAMUSCULAR; INTRAVENOUS at 03:11

## 2019-08-08 RX ADMIN — MOMETASONE FUROATE SCH PUFF: 220 INHALANT RESPIRATORY (INHALATION) at 09:43

## 2019-08-08 RX ADMIN — PROMETHAZINE HYDROCHLORIDE AND CODEINE PHOSPHATE PRN ML: 6.25; 1 SOLUTION ORAL at 03:11

## 2019-08-08 RX ADMIN — MOMETASONE FUROATE 1 PUFF: 220 INHALANT RESPIRATORY (INHALATION) at 09:43

## 2019-08-08 RX ADMIN — TENOFOVIR DISOPROXIL FUMARATE 1 MG: 300 TABLET, COATED ORAL at 09:42

## 2019-08-08 RX ADMIN — TENOFOVIR DISOPROXIL FUMARATE SCH MG: 300 TABLET, COATED ORAL at 09:42

## 2019-08-08 RX ADMIN — PROMETHAZINE HYDROCHLORIDE AND CODEINE PHOSPHATE 1 ML: 6.25; 1 SOLUTION ORAL at 03:11

## 2019-08-08 RX ADMIN — FLUTICASONE PROPIONATE 1 SPRAY: 50 SPRAY, METERED NASAL at 00:06

## 2019-08-08 NOTE — DS
Date/Time of Note


Date/Time of Note


DATE: 19 


TIME: 08:05





Discharge Summary


Admission/Discharge Info


Admit Date/Time


Aug 6, 2019 at 15:50


Discharge Date/Time


2019


Discharge Diagnosis


Pregnancy at 34 weeks and 4 days


Upper respiratory infection and allergic rhinitis and postnasal drip


Right pyelonephritis


Patient Condition:  Good


Consults


Internal medicine


Procedures


She was treated with antibiotics


Hospital Course


39-year-old  2 para 1 with pregnancy at 34 weeks and 4 days with due date


2019 was admitted to the hospital due to respiratory infection 


with significant allergic rhinitis and postnasal drip as well as right 


pyelonephritis.  She was treated with IV Rocephin and Zithromax.  She was also 


treated with steroid inhalers and albuterol and Flonase as well as promethazine 


with codeine for cough suppression.  Overall she feels better although she is 


still continues to have some flank tenderness and pain as well as cough.  


Throughout the hospitalization she remained afebrile with normal white blood 


cell count.  She reports good fetal movements.  She denies loss of fluid per 


vagina except incontinence with coughing.  She denies vaginal bleeding.  She 


denies uterine contractions.  She reports good fetal movements.


I gave patient prescription for Qvar albuterol promethazine with codeine Keflex 


500 mg every 6 hours for 7 days and Z-Eric and Flonase.


Home Meds


Active Scripts


Albuterol Sulfate* (Albuterol Sulfate* Neb) 0.083%-3 Ml Neb, 2.5 MG NEB Q4 PRN 


for SHORTNESS OF BREATH, #30 EA


   Prov:TOMÁS MEYER PA-C         19


Benzonatate* (Tessalon Perle*) 100 Mg Capsule, 100 MG PO Q8H PRN for COUGH for 7


Days, CAP


   Prov:TOMÁS MEYER PA-C         19


Reported Medications


Tenofovir Disoproxil Fumarate (Tenofovir Disoproxil Fumarate) 300 Mg Tablet, 300


MG PO DAILY, TAB


   19


Follow-up Plan


1 week with Dania Ward MD


Primary Care Provider


Dania Ward MD


Time spent on discharge:   > 30 minutes


Pending Labs





Laboratory Tests


         Test
                                             19
05:01


         White Blood Count
                      6.3 10^3/ul
(4.8-10.8)


         Red Blood Count
                      3.13 10^6/ul
(4.20-5.40)


         Hemoglobin
                              10.2 g/dl
(12.0-16.0)


         Hematocrit
                                 30.2 %
(37.0-47.0)


         Mean Corpuscular Volume
                  96.5 fl
(82.0-101.0)


         Mean Corpuscular Hemoglobin
               32.6 pg
(29.0-33.0)


         Mean Corpuscular Hemoglobin
Concent      33.8 g/dl
(32.0-37.0)


         Red Cell Distribution Width
                13.5 %
(11.5-14.5)


         Platelet Count
                          164 10^3/UL
(140-415)


         Mean Platelet Volume
                       10.1 fl
(7.4-10.4)


         Immature Granulocytes %
                 0.600 %
(0.001-0.429)


         Neutrophils %
                              68.5 %
(39.0-77.0)


         Lymphocytes %
                              20.6 %
(15.0-51.0)


         Monocytes %
                                  9.0 %
(0.0-11.0)


         Eosinophils %
                                 1.0 %
(0.0-7.0)


         Basophils %
                                   0.3 %
(0.0-2.0)


         Nucleated Red Blood Cells %
             0.0 /100WBC
(0.0-0.0)


         Immature Granulocytes #
             0.040 10^3/ul
(0.0-0.031)


         Neutrophils #
                           4.3 10^3/ul
(1.6-7.5)


         Lymphocytes #
                           1.3 10^3/ul
(0.8-2.9)


         Monocytes #
                             0.6 10^3/ul
(0.3-0.9)


         Eosinophils #
                           0.1 10^3/ul
(0.0-0.5)


         Basophils #
                             0.0 10^3/ul
(0.0-0.1)


         Nucleated Red Blood Cells #
             0.0 10^3/ul
(0.0-0.0)


         Sodium Level
                             135 mmol/L
(135-144)


         Potassium Level
                          3.5 mmol/L
(3.5-5.1)


         Chloride Level
                            105 mmol/L
()


         Carbon Dioxide Level
                        24 mmol/L
(21-31)


         Anion Gap                                            6 (5-13)


         Blood Urea Nitrogen                            5 mg/dl (7-20)


         Creatinine
                             0.51 mg/dl
(0.44-1.00)


         Est Glomerular Filtrat Rate
mL/min   > 60 mL/min
(>60)


         Glucose Level
                               83 mg/dl
()


         Calcium Level
                            8.9 mg/dl
(8.4-10.2)


         Total Bilirubin
                           0.6 mg/dl
(0.2-1.3)


         Direct Bilirubin
                       0.00 mg/dl
(0.00-0.20)


         Indirect Bilirubin
                          0.6 mg/dl
(0-1.1)


         Aspartate Amino Transf
(AST/SGOT)              23 IU/L
(15-46)


         Alanine Aminotransferase
(ALT/SGPT)            26 IU/L
(13-69)


         Alkaline Phosphatase
                        172 IU/L
()


         Total Protein
                              6.0 g/dl
(6.1-8.1)


         Albumin
                                    2.7 g/dl
(3.3-4.9)


         Globulin
                                  3.30 g/dl
(1.3-3.2)


         Albumin/Globulin Ratio                                   0.81














DANIA WARD MD             Aug 8, 2019 08:10

## 2019-08-08 NOTE — PN
Date/Time of Note


Date/Time of Note


DATE: 8/8/19 


TIME: 14:02





Assessment/Plan


VTE Prophylaxis


Risk score (from [facility])>0 risk:  1


SCD applied (from [facility]):  Yes


Pharmacological prophylaxis:  other


Pharm contraindication:  other





Lines/Catheters


IV Catheter Type (from [facility]):  Saline Lock





Assessment/Plan


Assessment/Plan


1. Rhinitis/sinusitis, continue flonase and antibiotics


2. Cough due to post nasal drainage, robitussin prn


3. Normal pregnancy


Result Diagram:  


8/8/19 0501                                                                     


          8/8/19 0501





Results 24hrs





Laboratory Tests


               Test
                                8/8/19
05:01


               White Blood Count                           6.3


               Red Blood Count                           3.13  L


               Hemoglobin                                10.2  L


               Hematocrit                                30.2  L


               Mean Corpuscular Volume                    96.5


               Mean Corpuscular Hemoglobin                32.6


               Mean Corpuscular Hemoglobin
Concent       33.8  



               Red Cell Distribution Width                13.5


               Platelet Count                              164


               Mean Platelet Volume                       10.1


               Immature Granulocytes %                  0.600  H


               Neutrophils %                              68.5


               Lymphocytes %                              20.6


               Monocytes %                                 9.0


               Eosinophils %                               1.0


               Basophils %                                 0.3


               Nucleated Red Blood Cells %                 0.0


               Immature Granulocytes #                  0.040  H


               Neutrophils #                               4.3


               Lymphocytes #                               1.3


               Monocytes #                                 0.6


               Eosinophils #                               0.1


               Basophils #                                 0.0


               Nucleated Red Blood Cells #                 0.0


               Sodium Level                                135


               Potassium Level                             3.5


               Chloride Level                              105


               Carbon Dioxide Level                         24


               Anion Gap                                     6


               Blood Urea Nitrogen                          5  L


               Creatinine                                 0.51


               Est Glomerular Filtrat Rate
mL/min   > 60  



               Glucose Level                                83


               Calcium Level                               8.9


               Total Bilirubin                             0.6


               Direct Bilirubin                           0.00


               Indirect Bilirubin                          0.6


               Aspartate Amino Transf
(AST/SGOT)           23  



               Alanine Aminotransferase
(ALT/SGPT)         26  



               Alkaline Phosphatase                       172  H


               Total Protein                              6.0  L


               Albumin                                    2.7  L


               Globulin                                  3.30  H


               Albumin/Globulin Ratio                     0.81








Subjective


24 Hr Interval Summary


Free Text/Dictation


less nasal congestion, still cough with postnasal drainage





Exam/Review of Systems


Exam


Vitals





Vital Signs


  Date      Temp  Pulse  Resp  B/P (MAP)   Pulse Ox  O2          O2 Flow    FiO2


Time                                                 Delivery    Rate


    8/8/19  97.9     78    15      102/60        98


     07:40                           (74)


    8/7/19                                                                    21


     09:18








Intake and Output





8/7/19 8/7/19 8/8/19





1515:00


23:00


07:00





IntakeIntake Total


1220 ml


1100 ml


2150 ml





BalanceBalance


1220 ml


1100 ml


2150 ml











Constitutional:  alert, oriented, well developed


Psych:  no complaints, nl mood/affect


Head:  normocephalic, atraumatic


Eyes:  nl conjunctiva, EOMI, nl lids


ENMT:  other (nasal congestion)


Neck:  supple, non-tender


Respiratory:  clear to auscultation, normal air movement; 


   No congested cough, No crackles/rales, No diminished breath sounds, No 


intercostal retraction, No labored breathing, No respirations, No tactile 


fremitus, No wheezing, No other


Cardiovascular:  regular rate and rhythm, nl pulses; 


   No bruits, No diastolic murmur, No edema, No gallop, No irregular rhythm, No 


jugular venous distention (JVD), No murmurs/extra sounds, No rub, No systolic 


murmur, No S3, No S4, No other


Gastrointestinal:  soft, nl liver, spleen


Musculoskeletal:  nl extremities to inspection


Extremities:  normal pulses; 


   No calf tenderness, No cyanosis, No clubbing, No edema, No pitting pedal 


edema, No palpable cord, No tenderness, No other


Neurological:  CNS II-XII intact, nl mental status, nl speech, nl strength





Results


Results 24hrs





Laboratory Tests


               Test
                                8/8/19
05:01


               White Blood Count                           6.3


               Red Blood Count                           3.13  L


               Hemoglobin                                10.2  L


               Hematocrit                                30.2  L


               Mean Corpuscular Volume                    96.5


               Mean Corpuscular Hemoglobin                32.6


               Mean Corpuscular Hemoglobin
Concent       33.8  



               Red Cell Distribution Width                13.5


               Platelet Count                              164


               Mean Platelet Volume                       10.1


               Immature Granulocytes %                  0.600  H


               Neutrophils %                              68.5


               Lymphocytes %                              20.6


               Monocytes %                                 9.0


               Eosinophils %                               1.0


               Basophils %                                 0.3


               Nucleated Red Blood Cells %                 0.0


               Immature Granulocytes #                  0.040  H


               Neutrophils #                               4.3


               Lymphocytes #                               1.3


               Monocytes #                                 0.6


               Eosinophils #                               0.1


               Basophils #                                 0.0


               Nucleated Red Blood Cells #                 0.0


               Sodium Level                                135


               Potassium Level                             3.5


               Chloride Level                              105


               Carbon Dioxide Level                         24


               Anion Gap                                     6


               Blood Urea Nitrogen                          5  L


               Creatinine                                 0.51


               Est Glomerular Filtrat Rate
mL/min   > 60  



               Glucose Level                                83


               Calcium Level                               8.9


               Total Bilirubin                             0.6


               Direct Bilirubin                           0.00


               Indirect Bilirubin                          0.6


               Aspartate Amino Transf
(AST/SGOT)           23  



               Alanine Aminotransferase
(ALT/SGPT)         26  



               Alkaline Phosphatase                       172  H


               Total Protein                              6.0  L


               Albumin                                    2.7  L


               Globulin                                  3.30  H


               Albumin/Globulin Ratio                     0.81








Medications


Medication





Current Medications


Acetaminophen (Tylenol Tab) 1,000 mg Q6H  PRN PO MILD PAIN(1-3)OR ELEVATED TEMP;


 Start 8/6/19 at 18:30


Albuterol (Proventil 0.083% (Neb)) 2.5 mg Q4H RESP THERAPY  PRN NEB SHORTNESS OF


BREATH Last administered on 8/7/19at 09:17; Admin Dose 2.5 MG;  Start 8/6/19 at 


18:30


Benzonatate (Tessalon) 100 mg Q8H  PRN PO COUGH Last administered on 8/7/19at 


21:00; Admin Dose 100 MG;  Start 8/6/19 at 18:30


Ceftriaxone Sodium 50 ml @  100 mls/hr Q24H IVPB  Last administered on 8/7/19at 


18:03; Admin Dose 100 MLS/HR;  Start 8/6/19 at 18:30


Azithromycin 250 ml @  250 mls/hr Q24H IVPB  Last administered on 8/7/19at 


18:38; Admin Dose 250 MLS/HR;  Start 8/6/19 at 18:30


Ondansetron HCl (Zofran Odt) 4 mg Q6H  PRN ODT NAUSEA/VOMITING;  Start 8/6/19 at


18:30


Diphenhydramine HCl (Benadryl) 25 mg QHS  PRN PO .INSOMNIA Last administered on 


8/7/19at 22:52; Admin Dose 25 MG;  Start 8/6/19 at 18:30


Albuterol (Ventolin Hfa) 2 puff Q4H RESP THERAPY  PRN INH SHORTNESS OF BREATH;  


Start 8/6/19 at 18:30


Mometasone Furoate (Asmanex) 1 puff BID INH  Last administered on 8/8/19at 09:4


3; Admin Dose 1 PUFF;  Start 8/6/19 at 21:00


Lactated Ringer's 1,000 ml @  125 mls/hr Q8H IV  Last administered on 8/8/19at 


03:11; Admin Dose 125 MLS/HR;  Start 8/6/19 at 19:00


Ipratropium Bromide (Atrovent 0.02% (Neb)) 0.5 mg Q4H RESP THERAPY  PRN HHN 


SHORTNESS OF BREATH Last administered on 8/7/19at 09:17; Admin Dose 0.5 MG;  


Start 8/6/19 at 23:30


Promethazine HCl/ Codeine (Phenergan/ Codeine) 10 ml Q4H  PRN PO COUGH Last 


administered on 8/8/19at 03:11; Admin Dose 10 ML;  Start 8/7/19 at 00:00


Tenofovir Disoproxil Fumarate (Viread) 300 mg WITH  BREAKFAST PO  Last 


administered on 8/8/19at 09:42; Admin Dose 300 MG;  Start 8/7/19 at 08:00


Guaifenesin (Robitussin Liquid Cup) 100 mg Q4H  PRN PO COUGH Last administered 


on 8/7/19at 21:02; Admin Dose 100 MG;  Start 8/7/19 at 16:30


Fluticasone Propionate (Flonase 0.05% Nasal) 1 spray BID  PRN NASAL NASAL 


CONGESTION;  Start 8/8/19 at 00:00











LISSETT SOLER MD                 Aug 8, 2019 14:05

## 2019-08-12 LAB — HEPATITIS B DELTA ANTIBODY: NEGATIVE

## 2019-08-27 ENCOUNTER — HOSPITAL ENCOUNTER (INPATIENT)
Dept: HOSPITAL 10 - L-D | Age: 39
LOS: 2 days | Discharge: HOME | End: 2019-08-29
Attending: SPECIALIST | Admitting: SPECIALIST
Payer: COMMERCIAL

## 2019-08-27 ENCOUNTER — HOSPITAL ENCOUNTER (INPATIENT)
Dept: HOSPITAL 91 - OBT | Age: 39
LOS: 2 days | Discharge: HOME | End: 2019-08-29
Payer: COMMERCIAL

## 2019-08-27 VITALS — RESPIRATION RATE: 18 BRPM | SYSTOLIC BLOOD PRESSURE: 100 MMHG | DIASTOLIC BLOOD PRESSURE: 56 MMHG | HEART RATE: 97 BPM

## 2019-08-27 VITALS
BODY MASS INDEX: 22.68 KG/M2 | WEIGHT: 120.15 LBS | HEIGHT: 61 IN | HEIGHT: 61 IN | BODY MASS INDEX: 22.68 KG/M2 | WEIGHT: 120.15 LBS

## 2019-08-27 DIAGNOSIS — Z3A.37: ICD-10-CM

## 2019-08-27 LAB
ADD MAN DIFF?: NO
BASOPHIL #: 0 10^3/UL (ref 0–0.1)
BASOPHILS %: 0.5 % (ref 0–2)
EOSINOPHILS #: 0.1 10^3/UL (ref 0–0.5)
EOSINOPHILS %: 1.6 % (ref 0–7)
HEMATOCRIT: 37.5 % (ref 37–47)
HEMOGLOBIN: 12.6 G/DL (ref 12–16)
HEPATITIS B SURFACE ANTIGEN: POSITIVE
IMMATURE GRANS #M: 0.04 10^3/UL (ref 0–0.03)
IMMATURE GRANS % (M): 0.6 % (ref 0–0.43)
INR: 0.81
LYMPHOCYTES #: 1.3 10^3/UL (ref 0.8–2.9)
LYMPHOCYTES %: 21.3 % (ref 15–51)
MEAN CORPUSCULAR HEMOGLOBIN: 33 PG (ref 29–33)
MEAN CORPUSCULAR HGB CONC: 33.6 G/DL (ref 32–37)
MEAN CORPUSCULAR VOLUME: 98.2 FL (ref 82–101)
MEAN PLATELET VOLUME: 11.7 FL (ref 7.4–10.4)
MONOCYTE #: 0.6 10^3/UL (ref 0.3–0.9)
MONOCYTES %: 9.4 % (ref 0–11)
NEUTROPHIL #: 4.1 10^3/UL (ref 1.6–7.5)
NEUTROPHILS %: 66.6 % (ref 39–77)
NUCLEATED RED BLOOD CELLS #: 0 10^3/UL (ref 0–0)
NUCLEATED RED BLOOD CELLS%: 0 /100WBC (ref 0–0)
PARTIAL THROMBOPLASTIN TIME: 25.8 SEC (ref 23–35)
PLATELET COUNT: 153 10^3/UL (ref 140–415)
PROTIME: 11.3 SEC (ref 11.9–14.9)
PT RATIO: 0.9
RED BLOOD COUNT: 3.82 10^6/UL (ref 4.2–5.4)
RED CELL DISTRIBUTION WIDTH: 13.8 % (ref 11.5–14.5)
WHITE BLOOD COUNT: 6.2 10^3/UL (ref 4.8–10.8)

## 2019-08-27 PROCEDURE — 87340 HEPATITIS B SURFACE AG IA: CPT

## 2019-08-27 PROCEDURE — 85025 COMPLETE CBC W/AUTO DIFF WBC: CPT

## 2019-08-27 PROCEDURE — 86850 RBC ANTIBODY SCREEN: CPT

## 2019-08-27 PROCEDURE — 85730 THROMBOPLASTIN TIME PARTIAL: CPT

## 2019-08-27 PROCEDURE — 86592 SYPHILIS TEST NON-TREP QUAL: CPT

## 2019-08-27 PROCEDURE — 86901 BLOOD TYPING SEROLOGIC RH(D): CPT

## 2019-08-27 PROCEDURE — 86900 BLOOD TYPING SEROLOGIC ABO: CPT

## 2019-08-27 PROCEDURE — 62322 NJX INTERLAMINAR LMBR/SAC: CPT

## 2019-08-27 PROCEDURE — 86704 HEP B CORE ANTIBODY TOTAL: CPT

## 2019-08-27 PROCEDURE — A4310 INSERT TRAY W/O BAG/CATH: HCPCS

## 2019-08-27 PROCEDURE — 85610 PROTHROMBIN TIME: CPT

## 2019-08-27 RX ADMIN — PYRIDOXINE HYDROCHLORIDE SCH MLS/HR: 100 INJECTION, SOLUTION INTRAMUSCULAR; INTRAVENOUS at 18:31

## 2019-08-27 RX ADMIN — PYRIDOXINE HYDROCHLORIDE SCH MLS/HR: 100 INJECTION, SOLUTION INTRAMUSCULAR; INTRAVENOUS at 21:47

## 2019-08-27 RX ADMIN — PYRIDOXINE HYDROCHLORIDE 1 MLS/HR: 100 INJECTION, SOLUTION INTRAMUSCULAR; INTRAVENOUS at 18:31

## 2019-08-27 RX ADMIN — Medication 1 MLS/HR: at 23:40

## 2019-08-27 RX ADMIN — PYRIDOXINE HYDROCHLORIDE 1 MLS/HR: 100 INJECTION, SOLUTION INTRAMUSCULAR; INTRAVENOUS at 21:47

## 2019-08-27 RX ADMIN — PYRIDOXINE HYDROCHLORIDE 1 MLS/HR: 100 INJECTION, SOLUTION INTRAMUSCULAR; INTRAVENOUS at 20:03

## 2019-08-28 VITALS — DIASTOLIC BLOOD PRESSURE: 50 MMHG | SYSTOLIC BLOOD PRESSURE: 95 MMHG | RESPIRATION RATE: 18 BRPM | HEART RATE: 70 BPM

## 2019-08-28 VITALS — SYSTOLIC BLOOD PRESSURE: 102 MMHG | DIASTOLIC BLOOD PRESSURE: 64 MMHG | HEART RATE: 61 BPM | RESPIRATION RATE: 18 BRPM

## 2019-08-28 VITALS — HEART RATE: 60 BPM | DIASTOLIC BLOOD PRESSURE: 50 MMHG | RESPIRATION RATE: 18 BRPM | SYSTOLIC BLOOD PRESSURE: 100 MMHG

## 2019-08-28 VITALS — SYSTOLIC BLOOD PRESSURE: 101 MMHG | HEART RATE: 60 BPM | DIASTOLIC BLOOD PRESSURE: 62 MMHG | RESPIRATION RATE: 18 BRPM

## 2019-08-28 VITALS — DIASTOLIC BLOOD PRESSURE: 54 MMHG | SYSTOLIC BLOOD PRESSURE: 98 MMHG | RESPIRATION RATE: 18 BRPM | HEART RATE: 56 BPM

## 2019-08-28 LAB — RAPID PLASMA REAGIN: NONREACTIVE

## 2019-08-28 RX ADMIN — IBUPROFEN SCH MG: 600 TABLET ORAL at 05:37

## 2019-08-28 RX ADMIN — PYRIDOXINE HYDROCHLORIDE SCH MLS/HR: 100 INJECTION, SOLUTION INTRAMUSCULAR; INTRAVENOUS at 05:00

## 2019-08-28 RX ADMIN — DOCUSATE SODIUM AND SENNOSIDES SCH TAB: 8.6; 5 TABLET, FILM COATED ORAL at 09:00

## 2019-08-28 RX ADMIN — IBUPROFEN 1 MG: 600 TABLET ORAL at 19:27

## 2019-08-28 RX ADMIN — Medication 1 SPRAY: at 05:37

## 2019-08-28 RX ADMIN — DOCUSATE SODIUM AND SENNOSIDES 1 TAB: 8.6; 5 TABLET, FILM COATED ORAL at 21:00

## 2019-08-28 RX ADMIN — DOCUSATE SODIUM AND SENNOSIDES 1 TAB: 8.6; 5 TABLET, FILM COATED ORAL at 09:00

## 2019-08-28 RX ADMIN — IBUPROFEN 1 MG: 600 TABLET ORAL at 18:00

## 2019-08-28 RX ADMIN — PYRIDOXINE HYDROCHLORIDE 1 MLS/HR: 100 INJECTION, SOLUTION INTRAMUSCULAR; INTRAVENOUS at 00:40

## 2019-08-28 RX ADMIN — IBUPROFEN 1 MG: 600 TABLET ORAL at 12:00

## 2019-08-28 RX ADMIN — Medication 1 MLS/HR: at 00:38

## 2019-08-28 RX ADMIN — PYRIDOXINE HYDROCHLORIDE SCH MLS/HR: 100 INJECTION, SOLUTION INTRAMUSCULAR; INTRAVENOUS at 00:40

## 2019-08-28 RX ADMIN — DOCUSATE SODIUM AND SENNOSIDES SCH TAB: 8.6; 5 TABLET, FILM COATED ORAL at 21:00

## 2019-08-28 RX ADMIN — PYRIDOXINE HYDROCHLORIDE 1 MLS/HR: 100 INJECTION, SOLUTION INTRAMUSCULAR; INTRAVENOUS at 05:00

## 2019-08-28 RX ADMIN — IBUPROFEN SCH MG: 600 TABLET ORAL at 18:00

## 2019-08-28 RX ADMIN — IBUPROFEN 1 MG: 600 TABLET ORAL at 05:37

## 2019-08-28 RX ADMIN — IBUPROFEN SCH MG: 600 TABLET ORAL at 12:00

## 2019-08-28 RX ADMIN — WITCH HAZEL 1 PAD: 500 SOLUTION RECTAL; TOPICAL at 05:37

## 2019-08-28 RX ADMIN — IBUPROFEN SCH MG: 600 TABLET ORAL at 19:27

## 2019-08-29 VITALS — SYSTOLIC BLOOD PRESSURE: 89 MMHG | RESPIRATION RATE: 16 BRPM | HEART RATE: 58 BPM | DIASTOLIC BLOOD PRESSURE: 52 MMHG

## 2019-08-29 VITALS — HEART RATE: 58 BPM | RESPIRATION RATE: 16 BRPM | DIASTOLIC BLOOD PRESSURE: 59 MMHG | SYSTOLIC BLOOD PRESSURE: 106 MMHG

## 2019-08-29 VITALS — RESPIRATION RATE: 18 BRPM | HEART RATE: 72 BPM | DIASTOLIC BLOOD PRESSURE: 56 MMHG | SYSTOLIC BLOOD PRESSURE: 96 MMHG

## 2019-08-29 LAB
ADD MAN DIFF?: NO
BASOPHIL #: 0.1 10^3/UL (ref 0–0.1)
BASOPHILS %: 0.7 % (ref 0–2)
EOSINOPHILS #: 0.2 10^3/UL (ref 0–0.5)
EOSINOPHILS %: 1.8 % (ref 0–7)
HEMATOCRIT: 33.5 % (ref 37–47)
HEMOGLOBIN: 11 G/DL (ref 12–16)
HEPATITIS B SURFACE ANTIGEN: REACTIVE
IMMATURE GRANS #M: 0.05 10^3/UL (ref 0–0.03)
IMMATURE GRANS % (M): 0.6 % (ref 0–0.43)
LYMPHOCYTES #: 1.8 10^3/UL (ref 0.8–2.9)
LYMPHOCYTES %: 19.7 % (ref 15–51)
MEAN CORPUSCULAR HEMOGLOBIN: 32.5 PG (ref 29–33)
MEAN CORPUSCULAR HGB CONC: 32.8 G/DL (ref 32–37)
MEAN CORPUSCULAR VOLUME: 99.1 FL (ref 82–101)
MEAN PLATELET VOLUME: 11.7 FL (ref 7.4–10.4)
MONOCYTE #: 0.7 10^3/UL (ref 0.3–0.9)
MONOCYTES %: 7.5 % (ref 0–11)
NEUTROPHIL #: 6.2 10^3/UL (ref 1.6–7.5)
NEUTROPHILS %: 69.7 % (ref 39–77)
NUCLEATED RED BLOOD CELLS #: 0 10^3/UL (ref 0–0)
NUCLEATED RED BLOOD CELLS%: 0 /100WBC (ref 0–0)
PLATELET COUNT: 116 10^3/UL (ref 140–415)
RED BLOOD COUNT: 3.38 10^6/UL (ref 4.2–5.4)
RED CELL DISTRIBUTION WIDTH: 14 % (ref 11.5–14.5)
WHITE BLOOD COUNT: 8.9 10^3/UL (ref 4.8–10.8)

## 2019-08-29 RX ADMIN — DOCUSATE SODIUM AND SENNOSIDES 1 TAB: 8.6; 5 TABLET, FILM COATED ORAL at 14:56

## 2019-08-29 RX ADMIN — DOCUSATE SODIUM AND SENNOSIDES SCH TAB: 8.6; 5 TABLET, FILM COATED ORAL at 14:56

## 2019-08-29 RX ADMIN — IBUPROFEN 1 MG: 600 TABLET ORAL at 17:45

## 2019-08-29 RX ADMIN — IBUPROFEN 1 MG: 600 TABLET ORAL at 06:00

## 2019-08-29 RX ADMIN — DOCUSATE SODIUM AND SENNOSIDES 1 TAB: 8.6; 5 TABLET, FILM COATED ORAL at 09:00

## 2019-08-29 RX ADMIN — DOCUSATE SODIUM AND SENNOSIDES SCH TAB: 8.6; 5 TABLET, FILM COATED ORAL at 09:00

## 2019-08-29 RX ADMIN — CLOSTRIDIUM TETANI TOXOID ANTIGEN (FORMALDEHYDE INACTIVATED), CORYNEBACTERIUM DIPHTHERIAE TOXOID ANTIGEN (FORMALDEHYDE INACTIVATED), BORDETELLA PERTUSSIS TOXOID ANTIGEN (GLUTARALDEHYDE INACTIVATED), BORDETELLA PERTUSSIS FILAMENTOUS HEMAGGLUTININ ANTIGEN (FORMALDEHYDE INACTIVATED), BORDETELLA PERTUSSIS PERTACTIN ANTIGEN, AND BORDETELLA PERTUSSIS FIMBRIAE 2/3 ANTIGEN 1 ML: 5; 2; 2.5; 5; 3; 5 INJECTION, SUSPENSION INTRAMUSCULAR at 17:46

## 2019-08-29 RX ADMIN — IBUPROFEN 1 MG: 600 TABLET ORAL at 11:11

## 2019-08-29 RX ADMIN — IBUPROFEN SCH MG: 600 TABLET ORAL at 11:11

## 2019-08-29 RX ADMIN — IBUPROFEN SCH MG: 600 TABLET ORAL at 17:45

## 2019-08-29 RX ADMIN — IBUPROFEN SCH MG: 600 TABLET ORAL at 06:00

## 2022-11-28 NOTE — ERD
ER Documentation


Chief Complaint


Chief Complaint





cough x 5 days; 34 weeks pregnant





HPI


39-year-old female with no reported past history of chronic hepatitis B 


currently 34 weeks pregnant on treatment who presents with complaint of 


persistent cough over the last 5 to 6 days.  Patient states she initially had 


fevers but no longer febrile.  She states cough is worsened causing her to have 


pelvic discomfort and urinary incontinence.  She has tried a number of 


over-the-counter antitussives which have not helped much with her symptoms.  


States cough productive of yellowish type phlegm with nasal congestion.  She 


otherwise denies continued fevers, shortness of breath, dyspnea, dyspnea on 


exertion, chest pain, nausea, vomiting, diarrhea, abdominal pain,any other 


concerning symptoms.  At time examination patient with persistent cough having 


to run to the trash can to clear her nose multiple times.





ROS


All systems reviewed and are negative except as per history of present illness.





Medications


Home Meds


Active Scripts


Albuterol Sulfate* (Albuterol Sulfate* Neb) 0.083%-3 Ml Neb, 2.5 MG NEB Q4 PRN 


for SHORTNESS OF BREATH, #30 EA


   Prov:TOMÁS MEYER PA-C         8/4/19


Benzonatate* (Tessalon Perle*) 100 Mg Capsule, 100 MG PO Q8H PRN for COUGH for 7


Days, CAP


   Prov:TOMÁS MEYER PA-C         8/4/19


Reported Medications


Tenofovir Disoproxil Fumarate (Tenofovir Disoproxil Fumarate) 300 Mg Tablet, 300


MG PO DAILY, TAB


   8/7/19





Allergies


Allergies:  


Coded Allergies:  


     No Known Allergy (Unverified , 8/4/19)





PMhx/Soc


History of Surgery:  No


Anesthesia Reaction:  No


Hx Neurological Disorder:  No


Hx Respiratory Disorders:  No


Hx Cardiac Disorders:  No


Hx Psychiatric Problems:  No


Hx Miscellaneous Medical Probl:  Yes (hep B)


Hx Alcohol Use:  No


Hx Substance Use:  No


Hx Tobacco Use:  No


Smoking Status:  Never smoker





FmHx


Family History:  No diabetes, No coronary disease, No other





Physical Exam


Vitals





Physical Exam


I have reviewed the triage vital signs.


Const: Well nourished, well developed, appears stated age


Eyes: PERRL, no conjunctival injection


HENT: NCAT, Neck supple without meningismus 


CV: RRR, Warm, well-perfused extremities


RESP: CTAB, cough limiting exam initially, unlabored respiratory effort


GI: soft, non-tender, non-distended, no masses


MSK: No gross deformities appreciated


Skin: Warm, dry. No rashes


Neuro: grossly non focal


Psych: Appropriate mood and affect.


Results 24 hrs





Laboratory Tests


              Test
                                  8/4/19
12:00


              White Blood Count                      7.4 10^3/ul


              Red Blood Count                       3.35 10^6/ul


              Hemoglobin                               11.2 g/dl


              Hematocrit                                  33.2 %


              Mean Corpuscular Volume                    99.1 fl


              Mean Corpuscular Hemoglobin                33.4 pg


              Mean Corpuscular Hemoglobin
Concent     33.7 g/dl 



              Red Cell Distribution Width                 13.3 %


              Platelet Count                         132 10^3/UL


              Mean Platelet Volume                       10.3 fl


              Immature Granulocytes %                    0.500 %


              Neutrophils %                               63.6 %


              Lymphocytes %                               23.2 %


              Monocytes %                                 11.9 %


              Eosinophils %                                0.5 %


              Basophils %                                  0.3 %


              Nucleated Red Blood Cells %            0.0 /100WBC


              Immature Granulocytes #              0.040 10^3/ul


              Neutrophils #                          4.7 10^3/ul


              Lymphocytes #                          1.7 10^3/ul


              Monocytes #                            0.9 10^3/ul


              Eosinophils #                          0.0 10^3/ul


              Basophils #                            0.0 10^3/ul


              Nucleated Red Blood Cells #            0.0 10^3/ul


              Sodium Level                            139 mmol/L


              Potassium Level                         3.4 mmol/L


              Chloride Level                          109 mmol/L


              Carbon Dioxide Level                     21 mmol/L


              Anion Gap                                        9


              Blood Urea Nitrogen                        5 mg/dl


              Creatinine                              0.47 mg/dl


              Est Glomerular Filtrat Rate
mL/min   > 60 mL/min 



              Glucose Level                             82 mg/dl


              Calcium Level                            8.4 mg/dl





Current Medications


 Medications
   Dose
          Sig/Garry
       Start Time
   Status  Last


 (Trade)       Ordered        Route
 PRN     Stop Time              Admin
Dose


                              Reason                                Admin


 Albuterol
     2.5 mg         ONCE  STAT
    8/4/19        DC            8/4/19


(Proventil
                   NEB
           10:34
 8/4/19                11:12



0.083% (Neb))                                10:36








Procedures/MDM


This patient presents with acute cough, most consistent with a nonemergent cause


of cough such as bronchitis. Differential diagnosis includes pneumonia, 


pulmonary embolism, viral URI. Presentation not consistent with acute bacterial 


pneumonia, influenza, asthma, transient airway hyperresponsiveness. Presentation


not consistent with chronic causes of cough (including GERD, asthma, postnasal 


discharge, medication side effect, CHF, lung cancer or mass).





Defer imaging at this time as patient particularly low risk for PNA, I am, 


pregnancy status given: HR <100, RR <24, Temperature <38C, Exam findings not 


consistent with focal consolidation.  


Case discussed with attending Dr. Loyd who agrees with above plan.  We will


continue symptomatic treatment and add albuterol inhaler and Tessalon Perles for


cough.  Patient instructed if symptoms do not mohsen or worsen to return to 


emergency room for further work-up.





Reassessment: Patient with improvement in symptoms after albuterol treatment in 


ED, good O2 saturation and nonlabored breathing





DISPOSITION PLAN:


We discussed follow up with the patient's primary care doctor within 24 to 48 


hours. Patient counseled regarding my diagnostic impression and care plan. Prior


to discharge all questions answered. Pt agrees with treatment plan and 


understands strict return precautions. Precautionary instructions provided 


including instructions to return to the ER if not improving or for any worsening


or changing symptoms or concerns.








Disclaimer: Inadvertent spelling and grammatical errors are likely due to 


EHR/dictation software use and do not reflect on the overall quality of patient 


care. Also, please note that the electronic time recorded on this note does not 


necessarily reflect the actual time of the patient encounter.





Departure


Diagnosis:  


   Primary Impression:  


   Cough


Condition:  Stable


Patient Instructions:  Cough, Chronic, Uncertain Cause, (Adult)


Referrals:  


[facility]


YOU HAVE RECEIVED A MEDICAL SCREENING EXAM AND THE RESULTS INDICATE THAT YOU DO 


NOT HAVE A CONDITION THAT REQUIRES URGENT TREATMENT IN THE EMERGENCY DEPARTMENT.





FURTHER EVALUATION AND TREATMENT OF YOUR CONDITION CAN WAIT UNTIL YOU ARE SEEN 


IN YOUR DOCTORS OFFICE WITHIN THE NEXT 1-2 DAYS. IT IS YOUR RESPONSIBILITY TO 


MAKE AN APPOINTMENT FOR FOLOW-UP CARE.





IF YOU HAVE A PRIMARY DOCTOR


--you should call your primary doctor and schedule an appointment





IF YOU DO NOT HAVE A PRIMARY DOCTOR YOU CAN CALL OUR PHYSICIAN REFERRAL HOTLINE 


AT


 [phone] 





IF YOU CAN NOT AFFORD TO SEE A PHYSICIAN YOU CAN CHOSE FROM THE FOLLOWING 


[facility]





[facility] [phone] [address] [facility] [phone] [address] [facility]. [facility] [phone] [address] [facility] [phone] [address] [facility] [phone] [address] [facility]. [facility] [phone] [address]





Additional Instructions:  


Call your primary care doctor TOMORROW for an appointment during the next 2-3 


days.See the doctor sooner or return here if your condition worsens before your 


appointment time.











TOMÁS MEYER PA-C              Aug 4, 2019 10:46
Yes